# Patient Record
Sex: MALE | Race: NATIVE HAWAIIAN OR OTHER PACIFIC ISLANDER | URBAN - METROPOLITAN AREA
[De-identification: names, ages, dates, MRNs, and addresses within clinical notes are randomized per-mention and may not be internally consistent; named-entity substitution may affect disease eponyms.]

---

## 2017-12-08 ENCOUNTER — HOSPITAL ENCOUNTER (OUTPATIENT)
Dept: LAB | Facility: MEDICAL CENTER | Age: 27
End: 2017-12-08
Attending: INTERNAL MEDICINE
Payer: COMMERCIAL

## 2017-12-08 ENCOUNTER — OFFICE VISIT (OUTPATIENT)
Dept: MEDICAL GROUP | Facility: LAB | Age: 27
End: 2017-12-08
Payer: COMMERCIAL

## 2017-12-08 VITALS
DIASTOLIC BLOOD PRESSURE: 82 MMHG | RESPIRATION RATE: 16 BRPM | HEIGHT: 69 IN | BODY MASS INDEX: 25.39 KG/M2 | OXYGEN SATURATION: 98 % | TEMPERATURE: 98 F | HEART RATE: 62 BPM | SYSTOLIC BLOOD PRESSURE: 116 MMHG | WEIGHT: 171.4 LBS

## 2017-12-08 DIAGNOSIS — Z00.00 ANNUAL PHYSICAL EXAM: ICD-10-CM

## 2017-12-08 DIAGNOSIS — Z23 NEED FOR VACCINATION: ICD-10-CM

## 2017-12-08 LAB
25(OH)D3 SERPL-MCNC: 31 NG/ML (ref 30–100)
ALBUMIN SERPL BCP-MCNC: 4.5 G/DL (ref 3.2–4.9)
ALBUMIN/GLOB SERPL: 2 G/DL
ALP SERPL-CCNC: 36 U/L (ref 30–99)
ALT SERPL-CCNC: 27 U/L (ref 2–50)
ANION GAP SERPL CALC-SCNC: 6 MMOL/L (ref 0–11.9)
AST SERPL-CCNC: 17 U/L (ref 12–45)
BILIRUB SERPL-MCNC: 0.8 MG/DL (ref 0.1–1.5)
BUN SERPL-MCNC: 17 MG/DL (ref 8–22)
CALCIUM SERPL-MCNC: 9.3 MG/DL (ref 8.5–10.5)
CHLORIDE SERPL-SCNC: 106 MMOL/L (ref 96–112)
CHOLEST SERPL-MCNC: 166 MG/DL (ref 100–199)
CO2 SERPL-SCNC: 29 MMOL/L (ref 20–33)
CREAT SERPL-MCNC: 1.06 MG/DL (ref 0.5–1.4)
ERYTHROCYTE [DISTWIDTH] IN BLOOD BY AUTOMATED COUNT: 39.5 FL (ref 35.9–50)
GFR SERPL CREATININE-BSD FRML MDRD: >60 ML/MIN/1.73 M 2
GLOBULIN SER CALC-MCNC: 2.3 G/DL (ref 1.9–3.5)
GLUCOSE SERPL-MCNC: 79 MG/DL (ref 65–99)
HCT VFR BLD AUTO: 49.5 % (ref 42–52)
HDLC SERPL-MCNC: 49 MG/DL
HGB BLD-MCNC: 16.9 G/DL (ref 14–18)
LDLC SERPL CALC-MCNC: 102 MG/DL
MCH RBC QN AUTO: 31.1 PG (ref 27–33)
MCHC RBC AUTO-ENTMCNC: 34.1 G/DL (ref 33.7–35.3)
MCV RBC AUTO: 91 FL (ref 81.4–97.8)
PLATELET # BLD AUTO: 186 K/UL (ref 164–446)
PMV BLD AUTO: 11.6 FL (ref 9–12.9)
POTASSIUM SERPL-SCNC: 4.5 MMOL/L (ref 3.6–5.5)
PROT SERPL-MCNC: 6.8 G/DL (ref 6–8.2)
RBC # BLD AUTO: 5.44 M/UL (ref 4.7–6.1)
SODIUM SERPL-SCNC: 141 MMOL/L (ref 135–145)
TRIGL SERPL-MCNC: 77 MG/DL (ref 0–149)
WBC # BLD AUTO: 4.6 K/UL (ref 4.8–10.8)

## 2017-12-08 PROCEDURE — 83036 HEMOGLOBIN GLYCOSYLATED A1C: CPT

## 2017-12-08 PROCEDURE — 90472 IMMUNIZATION ADMIN EACH ADD: CPT | Performed by: INTERNAL MEDICINE

## 2017-12-08 PROCEDURE — 90715 TDAP VACCINE 7 YRS/> IM: CPT | Performed by: INTERNAL MEDICINE

## 2017-12-08 PROCEDURE — 80053 COMPREHEN METABOLIC PANEL: CPT

## 2017-12-08 PROCEDURE — 90471 IMMUNIZATION ADMIN: CPT | Performed by: INTERNAL MEDICINE

## 2017-12-08 PROCEDURE — 82306 VITAMIN D 25 HYDROXY: CPT

## 2017-12-08 PROCEDURE — 90686 IIV4 VACC NO PRSV 0.5 ML IM: CPT | Performed by: INTERNAL MEDICINE

## 2017-12-08 PROCEDURE — 85027 COMPLETE CBC AUTOMATED: CPT

## 2017-12-08 PROCEDURE — 36415 COLL VENOUS BLD VENIPUNCTURE: CPT

## 2017-12-08 PROCEDURE — 99385 PREV VISIT NEW AGE 18-39: CPT | Mod: 25 | Performed by: INTERNAL MEDICINE

## 2017-12-08 PROCEDURE — 80061 LIPID PANEL: CPT

## 2017-12-08 ASSESSMENT — PATIENT HEALTH QUESTIONNAIRE - PHQ9: CLINICAL INTERPRETATION OF PHQ2 SCORE: 0

## 2017-12-08 NOTE — ASSESSMENT & PLAN NOTE
This is a pleasant 27-year-old gentleman who is here today to establish care with a new primary care provider. He recently moved from [facility] to [facility] for a job. 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 He is single, has a significant other but has no family in [place]. He is enjoying that outdoor activities such as skiing, mountain biking, mannitol and he also exercises at home with weights and push ups. He told me that he eats healthy in general. He is not a smoker.  He has no major complaints today.   He is up-to-date on most of his vaccinations except d-dimer and influenza which will be given today in the office.   We discussed about the importance of birth control, hydration and [place] area, and using sunscreens.

## 2017-12-08 NOTE — PROGRESS NOTES
Chief Complaint   Patient presents with   • Annual Exam       Subjective:     History of Present Illness: Patient is a 27 y.o. male. This pleasant patient is here today to establish care with a new primary care provider and to his annual exam.    Annual physical exam  This is a pleasant 27-year-old gentleman who is here today to establish care with a new primary care provider. He recently moved from [facility] to [facility] for a job. 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 He is single, has a significant other but has no family in [place]. He is enjoying that outdoor activities such as skiing, mountain biking, mannitol and he also exercises at home with weights and push ups. He told me that he eats healthy in general. He is not a smoker.  He has no major complaints today.   He is up-to-date on most of his vaccinations except d-dimer and influenza which will be given today in the office.   We discussed about the importance of birth control, hydration and [place] area, and using sunscreens.      Allergies: Patient has no allergy information on record.    No current [facility]-ordered outpatient prescriptions on file.     No current [facility]-ordered facility-administered medications on file.        No past medical history on file.    Past Surgical History:   Procedure Laterality Date   • THORACIC LAMINECTOMY  2010   • DENTAL EXTRACTION(S)         Social History   Substance Use Topics   • Smoking status: Never Smoker   • Smokeless tobacco: Never Used   • Alcohol use Yes      Comment: <1 day, beer       Family History   Problem Relation Age of Onset   • No Known Problems Mother    • No Known Problems Father    • No Known Problems Sister        ROS:     - Constitutional: Negative for fever, chills, unexpected weight change, and fatigue/generalized weakness.     - HEENT: Negative for headaches, vision changes, hearing changes, ear pain, ear discharge, sinus congestion, sore throat, and neck pain.      " - Respiratory: Negative for cough, sputum production, dyspnea and wheezing.    - Cardiovascular: Negative for chest pain, palpitations, orthopnea, and bilateral lower extremity edema.     - Gastrointestinal: Negative for heartburn, nausea, vomiting, abdominal pain, hematochezia, melena, diarrhea, constipation, and greasy/foul-smelling stools.     - Genitourinary: Negative for dysuria, polyuria, hematuria, pyuria, urinary urgency, and urinary incontinence.    - Musculoskeletal: Negative for myalgias, back pain, and joint pain.     - Skin: Negative for rash, itching, cyanotic skin color change.     - Neurological: Negative for dizziness, tingling, tremors, focal sensory deficit, focal weakness and headaches.     - Endo/Heme/Allergies: Does not bruise/bleed easily.     - Psychiatric/Behavioral: Negative for depression, suicidal/homicidal ideation and memory loss.        - NOTE: All other systems reviewed and are negative, except as in HPI.       Physical Exam:     Blood pressure 116/82, pulse 62, temperature 36.7 °C (98 °F), resp. rate 16, height 1.74 m (5' 8.5\"), weight 77.7 kg (171 lb 6.4 oz), SpO2 98 %. Body mass index is 25.68 kg/m².   General: Normal appearing. No distress.  HEENT: Normocephalic. Eyes conjunctiva clear lids without ptosis, pupils equal and reactive to light accommodation, ears normal shape and contour, canals are clear bilaterally, tympanic membranes are benign,  oropharynx is without erythema, edema or exudates.    Neck: Supple without JVD or bruit. Thyroid is not enlarged.  Pulmonary: Clear to ausculation.  Normal effort. No rales, ronchi, or wheezing.  Cardiovascular: Regular rate and rhythm without murmur. Radial pulses are intact, regular and symmetrical bilaterally.  Abdomen: Soft, nontender, nondistended. Normal bowel sounds. Liver and spleen are not palpable.  Neurologic: Grossly non-focal.  Lymph: No cervical, occipital or supraclavicular lymph nodes are palpable  Skin: Warm and dry.  No " obvious lesions. Old surgical scar over thoracolumbar spine noted.  Musculoskeletal: Normal gait. No extremity cyanosis, clubbing, or edema.  Psych: Normal mood and affect. Alert and oriented x3. Judgment and insight is normal.    Assessment and Plan:     1. Annual physical exam  As discussed in history of present illness, patient is not due for any cancer screening. We will proceed with the following screening annual labs.  Encouraged the patient to stay active with regular physical exercise, healthy eating habits, stay well-hydrated in [place] and apply sunscreen.  - CBC WITHOUT DIFFERENTIAL; Future  - COMP METABOLIC PANEL; Future  - VITAMIN D,25 HYDROXY; Future  - LIPID PROFILE; Future  - HEMOGLOBIN A1C; Future    2. Need for vaccination  Given today in the office.  - INFLUENZA VACCINE QUAD INJ >3Y(PF)  - TDAP VACCINE =>8YO IM      Health Maintenance:      Completed  Health Maintenance Due   Topic   • IMM DTaP/Tdap/Td Vaccine (1 - Tdap)   • IMM INFLUENZA (1)       Follow Up:      Return in about 1 year (around 12/8/2018) for Annual exam.    Please note that this dictation was created using voice recognition software. I have made every reasonable attempt to correct obvious errors, but I expect that there are errors of grammar and possibly content that I did not discover before finalizing the note.    Signed by: Marjorie Acosta M.D.

## 2017-12-09 LAB
EST. AVERAGE GLUCOSE BLD GHB EST-MCNC: 105 MG/DL
HBA1C MFR BLD: 5.3 % (ref 0–5.6)

## 2017-12-11 ENCOUNTER — TELEPHONE (OUTPATIENT)
Dept: MEDICAL GROUP | Facility: LAB | Age: 27
End: 2017-12-11

## 2017-12-12 NOTE — TELEPHONE ENCOUNTER
Phone Number Called: [phone] (home)     [facility] Released Result Comments     Viewed by Lanre Alvarez on 12/10/2017  7:27 PM   Written by Marjorie Acosta M.D. on 12/10/2017  4:09 PM   Kenzie De Dios,   I reviewed the results and they are mostly within the acceptable limits.   Your LDL cholesterol is a little bit on the higher side. This could improve with more healthy diet and a regular exercise regimen.     Thank you,   Marjorie Acosta M.D.       Left Message for patient to call back: N\A

## 2018-04-05 ENCOUNTER — HOSPITAL ENCOUNTER (OUTPATIENT)
Facility: MEDICAL CENTER | Age: 28
End: 2018-04-05
Attending: FAMILY MEDICINE
Payer: COMMERCIAL

## 2018-04-05 ENCOUNTER — OFFICE VISIT (OUTPATIENT)
Dept: MEDICAL GROUP | Facility: MEDICAL CENTER | Age: 28
End: 2018-04-05
Payer: COMMERCIAL

## 2018-04-05 VITALS
SYSTOLIC BLOOD PRESSURE: 112 MMHG | WEIGHT: 173 LBS | TEMPERATURE: 98.6 F | RESPIRATION RATE: 14 BRPM | DIASTOLIC BLOOD PRESSURE: 70 MMHG | BODY MASS INDEX: 26.22 KG/M2 | OXYGEN SATURATION: 96 % | HEART RATE: 74 BPM | HEIGHT: 68 IN

## 2018-04-05 DIAGNOSIS — R19.7 DIARRHEA, UNSPECIFIED TYPE: ICD-10-CM

## 2018-04-05 DIAGNOSIS — Z23 NEED FOR VACCINATION: ICD-10-CM

## 2018-04-05 DIAGNOSIS — R10.13 EPIGASTRIC PAIN: ICD-10-CM

## 2018-04-05 PROCEDURE — 90471 IMMUNIZATION ADMIN: CPT | Performed by: FAMILY MEDICINE

## 2018-04-05 PROCEDURE — 90632 HEPA VACCINE ADULT IM: CPT | Performed by: FAMILY MEDICINE

## 2018-04-05 PROCEDURE — 87899 AGENT NOS ASSAY W/OPTIC: CPT

## 2018-04-05 PROCEDURE — 99213 OFFICE O/P EST LOW 20 MIN: CPT | Mod: 25 | Performed by: FAMILY MEDICINE

## 2018-04-05 PROCEDURE — 87329 GIARDIA AG IA: CPT

## 2018-04-05 PROCEDURE — 90472 IMMUNIZATION ADMIN EACH ADD: CPT | Performed by: FAMILY MEDICINE

## 2018-04-05 PROCEDURE — 87328 CRYPTOSPORIDIUM AG IA: CPT

## 2018-04-05 PROCEDURE — 90746 HEPB VACCINE 3 DOSE ADULT IM: CPT | Performed by: FAMILY MEDICINE

## 2018-04-05 PROCEDURE — 87045 FECES CULTURE AEROBIC BACT: CPT

## 2018-04-05 PROCEDURE — 87177 OVA AND PARASITES SMEARS: CPT

## 2018-04-05 PROCEDURE — 87046 STOOL CULTR AEROBIC BACT EA: CPT

## 2018-04-05 NOTE — PROGRESS NOTES
"cc: Loose stools    Subjective:     Lanre Alvarez is a 28 y.o. male presenting to discuss loose stools and mild stomach ache. He was in [place] for 10 days, returned about 5 days ago. Prior to leaving [place], he started developing an epigastric discomfort, describes it as a butterfly sensation or a flip-flopping sensation. Is centrally located, does not radiate. Hasn't gone on for about a week now, seems to be somewhat improving. Denies any diarrhea, but does note that his stools are looser and has been going more frequently to the bathroom. Denies any fevers, bloating, gas, blood in the stools, nausea, vomiting, rashes, appetite loss, urinary symptoms, heartburn. He has been eating normally and drinking well. Has been taking ibuprofen with minimal improvement. His girlfriend, who is a PA, recommended that he be evaluated for possible parasites.    Review of systems:  See above.       No current outpatient prescriptions on file.    No Known Allergies    History reviewed. No pertinent past medical history.  Past Surgical History:   Procedure Laterality Date   • THORACIC LAMINECTOMY  2010   • DENTAL EXTRACTION(S)       Family History   Problem Relation Age of Onset   • No Known Problems Mother    • No Known Problems Father    • No Known Problems Sister      Social History     Social History   • Marital status: Single     Spouse name: N/A   • Number of children: N/A   • Years of education: N/A     Occupational History   • Not on file.     Social History Main Topics   • Smoking status: Never Smoker   • Smokeless tobacco: Never Used   • Alcohol use Yes      Comment: <1 day, beer   • Drug use: No   • Sexual activity: Yes     Birth control/ protection: Pill, Condom      Comment: single, one signifcant other.     Other Topics Concern   • Not on file     Social History Narrative   • No narrative on file       Objective:     Vitals: /70   Pulse 74   Temp 37 °C (98.6 °F)   Resp 14   Ht 1.727 m (5' 8\")   Wt 78.5 kg " (173 lb)   SpO2 96%   BMI 26.30 kg/m²   General: Alert, pleasant, NAD  HEENT: Normocephalic.  PERRL, EOMI, no icterus or pallor.  Conjunctivae and lids normal. External ears normal. Oropharynx non-erythematous, mucous membranes moist.  Neck supple.  No thyromegaly or masses palpated. No cervical or supraclavicular lymphadenopathy.  Heart: Regular rate and rhythm.  S1 and S2 normal.  No murmurs appreciated.  Respiratory: Normal respiratory effort.  Clear to auscultation bilaterally.  Abdomen: Non-distended, soft, non-tender, no guarding/rebound. Bowel sounds present.  No hepatosplenomegaly.  No hernias.  Skin: Warm, dry, no rashes.  Musculoskeletal: Gait is normal.  Moves all extremities well.  Extremities: No leg edema.    Psych:  Affect/mood is normal, judgement is good, memory is intact, grooming is appropriate.    Assessment/Plan:     Diagnoses and all orders for this visit:    Epigastric pain  Diarrhea, unspecified type  Discussed that his symptoms are quite mild but we'll check the following stool samples. If symptoms persist for another week or so, recommended doing the blood work as well. In the meantime, recommended trying some Zantac or omeprazole to see if he is having heartburn.  -     CULTURE STOOL; Future  -     COMPLETE O&P; Future  -     CRYPTO/GIARDIA RAPID ASSAY; Future  -     CBC WITHOUT DIFFERENTIAL; Future  -     COMP METABOLIC PANEL; Future  -     LIPASE; Future    Need for vaccination  -     HEPATITIS B VACCINE ADULT IM  -     HEPATITIS A VACCINE ADULT IM        Return if symptoms worsen or fail to improve.

## 2018-04-06 LAB
E COLI SXT1+2 STL IA: NORMAL
G LAMBLIA+C PARVUM AG STL QL RAPID: NORMAL
SIGNIFICANT IND 70042: NORMAL
SIGNIFICANT IND 70042: NORMAL
SITE SITE: NORMAL
SITE SITE: NORMAL
SOURCE SOURCE: NORMAL
SOURCE SOURCE: NORMAL

## 2018-04-08 LAB
BACTERIA STL CULT: NORMAL
E COLI SXT1+2 STL IA: NORMAL
SIGNIFICANT IND 70042: NORMAL
SITE SITE: NORMAL
SOURCE SOURCE: NORMAL

## 2018-04-11 LAB
O+P SPEC MICRO: NORMAL
SIGNIFICANT IND 70042: NORMAL
SITE SITE: NORMAL
SOURCE SOURCE: NORMAL

## 2018-04-24 ENCOUNTER — HOSPITAL ENCOUNTER (OUTPATIENT)
Facility: MEDICAL CENTER | Age: 28
End: 2018-04-24
Attending: SURGERY
Payer: COMMERCIAL

## 2018-04-24 LAB — PATHOLOGY CONSULT NOTE: NORMAL

## 2018-04-24 PROCEDURE — 88304 TISSUE EXAM BY PATHOLOGIST: CPT | Mod: 59

## 2018-08-22 ENCOUNTER — NON-PROVIDER VISIT (OUTPATIENT)
Dept: OCCUPATIONAL MEDICINE | Facility: CLINIC | Age: 28
End: 2018-08-22

## 2018-08-22 VITALS
TEMPERATURE: 98.7 F | SYSTOLIC BLOOD PRESSURE: 110 MMHG | WEIGHT: 180.4 LBS | OXYGEN SATURATION: 99 % | BODY MASS INDEX: 26.72 KG/M2 | HEART RATE: 69 BPM | DIASTOLIC BLOOD PRESSURE: 70 MMHG | HEIGHT: 69 IN

## 2018-08-22 DIAGNOSIS — Z23 ENCOUNTER FOR IMMUNIZATION: ICD-10-CM

## 2018-08-22 DIAGNOSIS — Z71.84 TRAVEL ADVICE ENCOUNTER: ICD-10-CM

## 2018-08-22 PROCEDURE — 90471 IMMUNIZATION ADMIN: CPT | Performed by: PREVENTIVE MEDICINE

## 2018-08-22 PROCEDURE — 90691 TYPHOID VACCINE IM: CPT | Performed by: PREVENTIVE MEDICINE

## 2018-08-22 PROCEDURE — 99202 OFFICE O/P NEW SF 15 MIN: CPT | Mod: 25 | Performed by: PREVENTIVE MEDICINE

## 2018-08-22 RX ORDER — DOXYCYCLINE HYCLATE 100 MG
100 TABLET ORAL DAILY
Qty: 51 TAB | Refills: 0 | Status: SHIPPED | OUTPATIENT
Start: 2018-08-22 | End: 2018-10-12

## 2018-08-22 RX ORDER — AZITHROMYCIN 500 MG/1
1000 TABLET, FILM COATED ORAL ONCE
Qty: 4 TAB | Refills: 0 | Status: SHIPPED | OUTPATIENT
Start: 2018-08-22 | End: 2018-08-22

## 2018-08-22 NOTE — PROGRESS NOTES
HPI: I have reviewed the travel health nurse note and plan of care, I do not recommend any changes. Patient is travelling to [facility] for 21 days. Patient is travelling for vacation. Patient desires malarial chemoprophylaxis and traveler's diarrhea treatment.     ROS: All systems were reviewed on intake form, form was reviewed and signed. See scanned documents in media. Pertinent positives and negatives included in HPI.    PMH: No past medical history on file.  MEDS:   No current outpatient prescriptions on file prior to visit.     No current facility-administered medications on file prior to visit.      ALLERGIES: No Known Allergies  SOCHX:   Past Surgical History:   Procedure Laterality Date   • THORACIC LAMINECTOMY  2010   • DENTAL EXTRACTION(S)       FH: No pertinent family history to this problem    Objective:  There were no vitals taken for this visit.    Constitutional: Patient appears well-developed and well-nourished.   HENT: Normocephalic and atraumatic.  Eyes: Conjunctiva normal. EOM are normal. No scleral icterus.   Cardiovascular: Normal rate.  Pulmonary/Chest: Effort normal.   Neurological: Patient alert and oriented to person, place, and time.   Skin: Skin is warm and dry.   Psychiatric: Patient has a normal mood and affect. Behavior is normal.     Assessment:    Travel advice encounter.     Plan:  Travel Health Consult  - Education regarding travel precautions as provided by Travel Health nurse  - Vaccinations and risks and benefits education as provided by Travel Health nurse  - Discussed risk and benefits of various malarial chemoprophylaxis options. Patient elected to use Doxycycline. Prescribed Doxycycline 100mg once daily for 51 days. To begin two days before travel  - Discussed risk and benefits of traveler's diarrhea treatment. Patient elects to use Azithromycin. Prescribed Azithromycin 1000mg once as needed for traveler's diarrhea.

## 2018-08-23 ENCOUNTER — NON-PROVIDER VISIT (OUTPATIENT)
Dept: MEDICAL GROUP | Facility: LAB | Age: 28
End: 2018-08-23
Payer: COMMERCIAL

## 2018-08-23 DIAGNOSIS — Z23 IMMUNIZATION DUE: ICD-10-CM

## 2018-08-23 PROCEDURE — 90471 IMMUNIZATION ADMIN: CPT | Performed by: FAMILY MEDICINE

## 2018-08-23 PROCEDURE — 90746 HEPB VACCINE 3 DOSE ADULT IM: CPT | Performed by: FAMILY MEDICINE

## 2018-08-23 NOTE — PROGRESS NOTES
Travel dates: 8/25//18  Countries to be visited:  [place] ([place], [place], [place], [place]  Reason for travel: Vacation     Rural travel: yes  High altitude travel: yes    Accommodations:  Hotel: yes   Hostel:  Camping:  Cruise: yes  With family:  Other:    Vaccines in past 30 days? No  Sick today? No  Allergies: sulfa antibiotics    The screening intake form was reviewed with the traveler. Health risks associated with their travel plans have been reviewed and discussed with the traveler. The traveler has been provided with vaccine information statements for the vaccines that are recommended and given the opportunity to discuss risks and benefits of vaccination and or medications. The traveler has received education on the travel itinerary provided and on the following topics.    Personal safety precautions: Yes  Food and water precautions: Yes  Management of traveler's diarrhea: Yes  Mosquito/insect bite prevention:Yes  Animal bites/Rabies prevention: No  High altitude precautions: No      RN comments:     Typhoid vaccine administered, vis given.    Malaria prophylaxis recommended. Risks and benefits reviewed.   Travelers diarrhea self tx recommended. Risks and benefits reviewed.       Physician consultation required: yes

## 2018-08-23 NOTE — PROGRESS NOTES
"Lanre Alvarez is a 28 y.o. male here for a non-provider visit for:   HEPATITIS B 2 of 3    Reason for immunization: continue or complete series started at the office  Immunization records indicate need for vaccine: Yes, confirmed with [place] [employer]  Minimum interval has been met for this vaccine: Yes  ABN completed: Not Indicated    Order and dose verified by: ANN MARIE  VIS Dated  7/20/16 was given to patient: Yes  All IAC Questionnaire questions were answered \"No.\"    Patient tolerated injection and no adverse effects were observed or reported: Yes    Pt scheduled for next dose in series: No    "

## 2018-10-11 ENCOUNTER — TELEPHONE (OUTPATIENT)
Dept: MEDICAL GROUP | Facility: LAB | Age: 28
End: 2018-10-11

## 2018-10-11 DIAGNOSIS — Z23 NEED FOR VACCINATION: Primary | ICD-10-CM

## 2018-10-11 NOTE — TELEPHONE ENCOUNTER
1. Caller Name: Lanre                                    Patient is on the MA Schedule 10/15/18 for Hep B and flu vaccine/injection.    SPECIFIC Action To Be Taken: Orders pending, please sign.

## 2018-10-15 ENCOUNTER — NON-PROVIDER VISIT (OUTPATIENT)
Dept: MEDICAL GROUP | Facility: LAB | Age: 28
End: 2018-10-15
Payer: COMMERCIAL

## 2018-10-15 DIAGNOSIS — Z23 NEED FOR VACCINATION: Primary | ICD-10-CM

## 2018-10-15 PROCEDURE — 90686 IIV4 VACC NO PRSV 0.5 ML IM: CPT | Performed by: INTERNAL MEDICINE

## 2018-10-15 PROCEDURE — 90471 IMMUNIZATION ADMIN: CPT | Performed by: INTERNAL MEDICINE

## 2018-10-15 NOTE — PROGRESS NOTES
"Lanre Alvarez is a 28 y.o. male here for a non-provider visit for:   FLU    Reason for immunization: Annual Flu Vaccine  Immunization records indicate need for vaccine: Yes, confirmed with Epic  Minimum interval has been met for this vaccine: Yes  ABN completed: Not Indicated    Order and dose verified by: AS  VIS Dated  8/7/15 was given to patient: Yes  All IAC Questionnaire questions were answered \"No.\"    Patient tolerated injection and no adverse effects were observed or reported: Yes    Pt scheduled for next dose in series: Not Indicated  "

## 2018-12-04 ENCOUNTER — OFFICE VISIT (OUTPATIENT)
Dept: MEDICAL GROUP | Facility: LAB | Age: 28
End: 2018-12-04
Payer: COMMERCIAL

## 2018-12-04 VITALS
HEIGHT: 68 IN | BODY MASS INDEX: 25.85 KG/M2 | RESPIRATION RATE: 18 BRPM | WEIGHT: 170.6 LBS | DIASTOLIC BLOOD PRESSURE: 70 MMHG | HEART RATE: 68 BPM | SYSTOLIC BLOOD PRESSURE: 118 MMHG | TEMPERATURE: 97.5 F | OXYGEN SATURATION: 98 %

## 2018-12-04 DIAGNOSIS — Z00.00 ANNUAL PHYSICAL EXAM: ICD-10-CM

## 2018-12-04 PROCEDURE — 99395 PREV VISIT EST AGE 18-39: CPT | Performed by: INTERNAL MEDICINE

## 2018-12-04 ASSESSMENT — PATIENT HEALTH QUESTIONNAIRE - PHQ9: CLINICAL INTERPRETATION OF PHQ2 SCORE: 0

## 2018-12-04 NOTE — PROGRESS NOTES
Chief Complaint   Patient presents with   • Annual Exam   • Immunizations     3rd Hep B       Subjective:     History of Present Illness: Patient is a 28 y.o. male. This pleasant patient who is here today for annual exam.    Annual physical exam  Lanre is here today for his annual follow-up exam.  He has been doing well overall and has no complaints for today.  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  He is sexually active, monogamous with his fiancé.  Has no concerns about STD and using absentince as a method for birth control.    He is physically active, exercise on a regular basis, he does hiking during summer season and he will be starting a skiing this now in winter season.  Healthy choices with loss of fruits and vegetables, we discussed the results of his labs from December 2017, they were all within normal limits except for mildly elevated LDL around 101.  He has eliminated egg results from his diet, continue to do a low-carb, low-fat and high fruits admittable his diet.  He has a Lasix surgery 2 years ago, he needs to schedule his appointment for eye exam this year.  He does see a dentist on a regular basis, has no dental problems.  He does apply sunscreen on a regular basis, has no skin lesions that he is concerned about.              Allergies: Sulfa drugs    No current [facility]-ordered outpatient prescriptions on file.     No current [facility]-ordered facility-administered medications on file.        History reviewed. No pertinent past medical history.    Past Surgical History:   Procedure Laterality Date   • LIPOMA EXCISION Right 2017   • THORACIC LAMINECTOMY  2010   • DENTAL EXTRACTION(S)         Social History   Substance Use Topics   • Smoking status: Never Smoker   • Smokeless tobacco: Never Used   • Alcohol use Yes      Comment: <1 day, beer       Family History   Problem Relation Age of Onset   • No Known Problems Mother    • No Known Problems Father    • No  "Known Problems Sister        ROS:     - Constitutional: Negative for fever, chills, unexpected weight change, and fatigue/generalized weakness.     - HEENT: Negative for headaches, vision changes, hearing changes, ear pain, ear discharge, sinus congestion, sore throat, and neck pain.      - Respiratory: Negative for cough, sputum production, dyspnea and wheezing.    - Cardiovascular: Negative for chest pain, palpitations, orthopnea, and bilateral lower extremity edema.     - Gastrointestinal: Negative for heartburn, nausea, vomiting, abdominal pain, hematochezia, melena, diarrhea, constipation, and greasy/foul-smelling stools.     - Genitourinary: Negative for dysuria, polyuria, hematuria, pyuria, urinary urgency, and urinary incontinence.    - Musculoskeletal: Negative for myalgias, back pain, and joint pain.     - Skin: Negative for rash, itching, cyanotic skin color change.     - Neurological: Negative for dizziness, tingling, tremors, focal sensory deficit, focal weakness and headaches.     - Endo/Heme/Allergies: Does not bruise/bleed easily.     - Psychiatric/Behavioral: Negative for depression, suicidal/homicidal ideation and memory loss.        - NOTE: All other systems reviewed and are negative, except as in HPI.       Physical Exam:     Blood pressure 118/70, pulse 68, temperature 36.4 °C (97.5 °F), temperature source Temporal, resp. rate 18, height 1.727 m (5' 8\"), weight 77.4 kg (170 lb 9.6 oz), SpO2 98 %. Body mass index is 25.94 kg/m².   General: Normal appearing. No distress.  HEENT: Normocephalic. Eyes conjunctiva clear lids without ptosis, pupils equal and reactive to light accommodation, ears normal shape and contour, canals are clear bilaterally, tympanic membranes are benign,  oropharynx is without erythema, edema or exudates.    Neck: Supple without JVD or bruit. Thyroid is not enlarged.  Pulmonary: Clear to ausculation.  Normal effort. No rales, ronchi, or wheezing.  Cardiovascular: Regular rate " and rhythm without murmur. Radial pulses are intact, regular and symmetrical bilaterally.  Abdomen: Soft, nontender, nondistended. Normal bowel sounds. Liver and spleen are not palpable.  Neurologic: Grossly non-focal.  Lymph: No cervical, occipital or supraclavicular lymph nodes are palpable  Skin: Warm and dry.  No obvious lesions.  Musculoskeletal: Normal gait. No extremity cyanosis, clubbing, or edema.  Psych: Normal mood and affect. Alert and oriented x3. Judgment and insight is normal.    Data:     LABS: 12/2017 CBC, CMP, Lipid, Vit D : Results reviewed and discussed with the patient, questions answered.      Assessment and Plan:     1. Annual physical exam  As discussed in HPI, patient is up-to-date on his health maintenance.  Discussed appropriate lifestyle modifications and healthy sex practices.  He is not a smoker, drinks alcohol once or twice per week.  We will proceed with the following labs for his annual exam for this year.  We will schedule him a visit for a third dose of hepatitis B.      - COMP METABOLIC PANEL; Future  - Lipid Profile; Future    I discussed with him that I will be leaving practice next year, he will call to schedule his annual physical exam before December with a new provider in our office.      Health Maintenance:      Completed  There are no preventive care reminders to display for this patient.    Follow Up:      Return in about 8 weeks (around 1/31/2019) for MA visit for hep B 3rd dose.    Please note that this dictation was created using voice recognition software. I have made every reasonable attempt to correct obvious errors, but I expect that there are errors of grammar and possibly content that I did not discover before finalizing the note.    Signed by: Marjorie Acosta M.D.

## 2018-12-04 NOTE — ASSESSMENT & PLAN NOTE
Lanre is here today for his annual follow-up exam.  He has been doing well overall and has no complaints for today.  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  He is sexually active, monogamous with his fiancé.  Has no concerns about STD and using absentince as a method for birth control.    He is physically active, exercise on a regular basis, he does hiking during summer season and he will be starting a skiing this now in winter season.  Healthy choices with loss of fruits and vegetables, we discussed the results of his labs from December 2017, they were all within normal limits except for mildly elevated LDL around 101.  He has eliminated egg results from his diet, continue to do a low-carb, low-fat and high fruits admittable his diet.  He has a Lasix surgery 2 years ago, he needs to schedule his appointment for eye exam this year.  He does see a dentist on a regular basis, has no dental problems.  He does apply sunscreen on a regular basis, has no skin lesions that he is concerned about.    He is up-to-date on his immunizations, he will be due for his third dose of hepatitis B vaccine in 4 months.

## 2018-12-07 ENCOUNTER — TELEPHONE (OUTPATIENT)
Dept: MEDICAL GROUP | Facility: LAB | Age: 28
End: 2018-12-07

## 2018-12-07 DIAGNOSIS — Z11.3 SCREEN FOR STD (SEXUALLY TRANSMITTED DISEASE): ICD-10-CM

## 2018-12-07 NOTE — TELEPHONE ENCOUNTER
Patient would like to add on labs to test for STD's along with the labs you ordered a couple days ago. I will notify patient once these are ordered though my chart.

## 2018-12-12 ENCOUNTER — HOSPITAL ENCOUNTER (OUTPATIENT)
Dept: LAB | Facility: MEDICAL CENTER | Age: 28
End: 2018-12-12
Attending: INTERNAL MEDICINE
Payer: COMMERCIAL

## 2018-12-12 DIAGNOSIS — Z11.3 SCREEN FOR STD (SEXUALLY TRANSMITTED DISEASE): ICD-10-CM

## 2018-12-12 DIAGNOSIS — Z00.00 ANNUAL PHYSICAL EXAM: ICD-10-CM

## 2018-12-12 PROCEDURE — 87491 CHLMYD TRACH DNA AMP PROBE: CPT

## 2018-12-12 PROCEDURE — 36415 COLL VENOUS BLD VENIPUNCTURE: CPT

## 2018-12-12 PROCEDURE — 80053 COMPREHEN METABOLIC PANEL: CPT

## 2018-12-12 PROCEDURE — 87591 N.GONORRHOEAE DNA AMP PROB: CPT

## 2018-12-12 PROCEDURE — 80061 LIPID PANEL: CPT

## 2018-12-13 LAB
ALBUMIN SERPL BCP-MCNC: 4.5 G/DL (ref 3.2–4.9)
ALBUMIN/GLOB SERPL: 1.6 G/DL
ALP SERPL-CCNC: 41 U/L (ref 30–99)
ALT SERPL-CCNC: 29 U/L (ref 2–50)
ANION GAP SERPL CALC-SCNC: 7 MMOL/L (ref 0–11.9)
AST SERPL-CCNC: 16 U/L (ref 12–45)
BILIRUB SERPL-MCNC: 0.7 MG/DL (ref 0.1–1.5)
BUN SERPL-MCNC: 17 MG/DL (ref 8–22)
CALCIUM SERPL-MCNC: 10 MG/DL (ref 8.5–10.5)
CHLORIDE SERPL-SCNC: 106 MMOL/L (ref 96–112)
CHOLEST SERPL-MCNC: 159 MG/DL (ref 100–199)
CO2 SERPL-SCNC: 28 MMOL/L (ref 20–33)
CREAT SERPL-MCNC: 1.11 MG/DL (ref 0.5–1.4)
FASTING STATUS PATIENT QL REPORTED: NORMAL
GLOBULIN SER CALC-MCNC: 2.8 G/DL (ref 1.9–3.5)
GLUCOSE SERPL-MCNC: 84 MG/DL (ref 65–99)
HDLC SERPL-MCNC: 52 MG/DL
LDLC SERPL CALC-MCNC: 90 MG/DL
POTASSIUM SERPL-SCNC: 4.3 MMOL/L (ref 3.6–5.5)
PROT SERPL-MCNC: 7.3 G/DL (ref 6–8.2)
SODIUM SERPL-SCNC: 141 MMOL/L (ref 135–145)
TRIGL SERPL-MCNC: 84 MG/DL (ref 0–149)

## 2018-12-14 LAB
C TRACH DNA SPEC QL NAA+PROBE: NEGATIVE
N GONORRHOEA DNA SPEC QL NAA+PROBE: NEGATIVE
SPECIMEN SOURCE: NORMAL

## 2019-02-08 ENCOUNTER — NON-PROVIDER VISIT (OUTPATIENT)
Dept: MEDICAL GROUP | Facility: LAB | Age: 29
End: 2019-02-08
Payer: COMMERCIAL

## 2019-02-08 ENCOUNTER — TELEPHONE (OUTPATIENT)
Dept: MEDICAL GROUP | Facility: LAB | Age: 29
End: 2019-02-08

## 2019-02-08 DIAGNOSIS — Z23 NEED FOR VACCINATION: ICD-10-CM

## 2019-02-08 PROCEDURE — 90471 IMMUNIZATION ADMIN: CPT | Performed by: INTERNAL MEDICINE

## 2019-02-08 PROCEDURE — 90746 HEPB VACCINE 3 DOSE ADULT IM: CPT | Performed by: INTERNAL MEDICINE

## 2019-02-08 NOTE — PROGRESS NOTES
"Lanre Alvarez is a 28 y.o. male here for a non-provider visit for:   HEPATITIS B 3 of 3    Reason for immunization: continue or complete series started at the office  Immunization records indicate need for vaccine: Yes, confirmed with Epic  Minimum interval has been met for this vaccine: Yes  ABN completed: Not Indicated    Order and dose verified by: JAYCOB  VIS Dated  10/12/18 was given to patient: Yes  All IAC Questionnaire questions were answered \"No.\"    Patient tolerated injection and no adverse effects were observed or reported: Yes    Pt scheduled for next dose in series: Not Indicated  "

## 2019-03-25 ENCOUNTER — TELEPHONE (OUTPATIENT)
Dept: MEDICAL GROUP | Facility: PHYSICIAN GROUP | Age: 29
End: 2019-03-25

## 2019-03-25 NOTE — TELEPHONE ENCOUNTER
Pt called and is traveling and his girl friend just found out that she has parisites and he would like to get a stool test done.

## 2019-04-02 ENCOUNTER — APPOINTMENT (OUTPATIENT)
Dept: MEDICAL GROUP | Facility: PHYSICIAN GROUP | Age: 29
End: 2019-04-02
Payer: COMMERCIAL

## 2019-10-28 ENCOUNTER — TELEPHONE (OUTPATIENT)
Dept: MEDICAL GROUP | Facility: LAB | Age: 29
End: 2019-10-28

## 2019-10-28 ENCOUNTER — NON-PROVIDER VISIT (OUTPATIENT)
Dept: MEDICAL GROUP | Facility: LAB | Age: 29
End: 2019-10-28
Payer: COMMERCIAL

## 2019-10-28 DIAGNOSIS — Z23 NEED FOR VACCINATION: ICD-10-CM

## 2019-10-28 PROCEDURE — 90632 HEPA VACCINE ADULT IM: CPT | Performed by: INTERNAL MEDICINE

## 2019-10-28 PROCEDURE — 90471 IMMUNIZATION ADMIN: CPT | Performed by: INTERNAL MEDICINE

## 2019-10-28 NOTE — PROGRESS NOTES
"Lanre Alvarez is a 29 y.o. male here for a non-provider visit for:   HEPATITIS A 2 of 2    Reason for immunization: Overdue/Provider Recommended  Immunization records indicate need for vaccine: Yes, confirmed with [place] [employer]  Minimum interval has been met for this vaccine: No  ABN completed: Not Indicated    Order and dose verified by: QAMAR  VIS Dated  07/20/2016 was given to patient: Yes  IAC Questionnaire abnormal.  Questions #2 were answered \"Yes.\"  Patient scheduled for office visit per protocol.    Patient tolerated injection and no adverse effects were observed or reported: Yes    Pt scheduled for next dose in series: No  "